# Patient Record
Sex: FEMALE | Race: WHITE | NOT HISPANIC OR LATINO
[De-identification: names, ages, dates, MRNs, and addresses within clinical notes are randomized per-mention and may not be internally consistent; named-entity substitution may affect disease eponyms.]

---

## 2019-04-17 ENCOUNTER — RX RENEWAL (OUTPATIENT)
Age: 49
End: 2019-04-17

## 2019-04-17 PROBLEM — Z00.00 ENCOUNTER FOR PREVENTIVE HEALTH EXAMINATION: Status: ACTIVE | Noted: 2019-04-17

## 2019-04-19 ENCOUNTER — APPOINTMENT (OUTPATIENT)
Dept: ENDOCRINOLOGY | Facility: CLINIC | Age: 49
End: 2019-04-19

## 2019-10-21 ENCOUNTER — RX RENEWAL (OUTPATIENT)
Age: 49
End: 2019-10-21

## 2019-10-30 ENCOUNTER — RX RENEWAL (OUTPATIENT)
Age: 49
End: 2019-10-30

## 2019-11-27 ENCOUNTER — APPOINTMENT (OUTPATIENT)
Dept: ENDOCRINOLOGY | Facility: CLINIC | Age: 49
End: 2019-11-27
Payer: COMMERCIAL

## 2019-11-27 VITALS
HEART RATE: 76 BPM | OXYGEN SATURATION: 97 % | HEIGHT: 63 IN | DIASTOLIC BLOOD PRESSURE: 78 MMHG | SYSTOLIC BLOOD PRESSURE: 118 MMHG | WEIGHT: 154 LBS | TEMPERATURE: 97.8 F | BODY MASS INDEX: 27.29 KG/M2

## 2019-11-27 DIAGNOSIS — Z83.3 FAMILY HISTORY OF DIABETES MELLITUS: ICD-10-CM

## 2019-11-27 DIAGNOSIS — Z78.9 OTHER SPECIFIED HEALTH STATUS: ICD-10-CM

## 2019-11-27 PROCEDURE — 99214 OFFICE O/P EST MOD 30 MIN: CPT | Mod: 25

## 2019-11-27 PROCEDURE — 36415 COLL VENOUS BLD VENIPUNCTURE: CPT

## 2019-12-14 PROBLEM — Z83.3 FAMILY HISTORY OF DIABETES MELLITUS: Status: ACTIVE | Noted: 2019-12-14

## 2019-12-14 PROBLEM — Z78.9 NON-SMOKER: Status: ACTIVE | Noted: 2019-12-14

## 2019-12-14 NOTE — HISTORY OF PRESENT ILLNESS
[FreeTextEntry1] : Ms. KHAI FELIPE   is a 49 year  year old  female who returns today in follow up with regard to a history of hypothyroidism.  Present for over 15 years She  is currently taking [place] thyroid 150 mcg -had been on 180 mcg prior.  Originally was hyperthyroid around 1998 or prior and was tx with with [place] . She  has been compliant in taking the [place] daily, away from food or any medication that may inhibit absorption. She  has tolerated this medication well. Without any apparent adverse effects.  She denies any temperature intolerance, significant weight changes, or severe fatigue. She  in addition denies any palpitations, tremors, anxiousness, change in bowel habits or significant change in moods. .\par Additional medical history includes  D3 4,000 iu daily.\par FH  Father with type 2 dm  no thyroid hx.\par Feels well.  Menses reg. Sleeps well.\par No concerns with hair or nails\par  \par \par

## 2019-12-22 LAB
25(OH)D3 SERPL-MCNC: 28.1 NG/ML
T3FREE SERPL-MCNC: 2.12 PG/ML
T4 FREE SERPL-MCNC: 0.8 NG/DL
THYROGLOB AB SERPL-ACNC: 25 IU/ML
THYROPEROXIDASE AB SERPL IA-ACNC: 26.5 IU/ML
TSH SERPL-ACNC: 0.45 UIU/ML
TSI ACT/NOR SER: 0.59 IU/L
VIT B12 SERPL-MCNC: 737 PG/ML

## 2021-05-27 ENCOUNTER — APPOINTMENT (OUTPATIENT)
Dept: ENDOCRINOLOGY | Facility: CLINIC | Age: 51
End: 2021-05-27
Payer: COMMERCIAL

## 2021-05-27 VITALS
BODY MASS INDEX: 29.06 KG/M2 | WEIGHT: 164 LBS | SYSTOLIC BLOOD PRESSURE: 120 MMHG | HEART RATE: 78 BPM | HEIGHT: 63 IN | TEMPERATURE: 98.6 F | OXYGEN SATURATION: 97 % | RESPIRATION RATE: 16 BRPM | DIASTOLIC BLOOD PRESSURE: 78 MMHG

## 2021-05-27 PROCEDURE — 36415 COLL VENOUS BLD VENIPUNCTURE: CPT

## 2021-05-27 PROCEDURE — 99072 ADDL SUPL MATRL&STAF TM PHE: CPT

## 2021-05-27 PROCEDURE — 99214 OFFICE O/P EST MOD 30 MIN: CPT | Mod: 25

## 2021-05-27 NOTE — HISTORY OF PRESENT ILLNESS
[FreeTextEntry1] : Ms. KHAI FELIPE is a 50 year old female who returns today in follow up with regard to a history of hypothyroidism. Present for over 15 years She is currently taking [place] thyroid 150 mcg 5 days and 120 mcg 2 days.  Originally was hyperthyroid around 1998 or prior and was tx with with [place] . She has been compliant in taking the [place] daily, away from food or any medication that may inhibit absorption. She has tolerated this medication well. Without any apparent adverse effects. She denies any temperature intolerance. The patient does note fatigue and weight gain. She had been doing keto but did not see an results. Notes she drink 4 cups of coffee a day. She in addition denies any palpitations, tremors, anxiousness, change in bowel habits or significant change in moods. Patient states that she is in perimenopause. \par Additional medical history includes D3 5,000 iu daily.\par FH Father with type 2 dm no thyroid hx.\par Feels well. Menses reg. Sleeps well.\par No concerns with hair or nails

## 2021-06-01 LAB
25(OH)D3 SERPL-MCNC: 44.2 NG/ML
ALBUMIN SERPL ELPH-MCNC: 4.6 G/DL
ALP BLD-CCNC: 52 U/L
ALT SERPL-CCNC: 16 U/L
ANION GAP SERPL CALC-SCNC: 14 MMOL/L
AST SERPL-CCNC: 14 U/L
BASOPHILS # BLD AUTO: 0.05 K/UL
BASOPHILS NFR BLD AUTO: 0.9 %
BILIRUB SERPL-MCNC: 0.2 MG/DL
BUN SERPL-MCNC: 11 MG/DL
CALCIUM SERPL-MCNC: 10.1 MG/DL
CHLORIDE SERPL-SCNC: 101 MMOL/L
CO2 SERPL-SCNC: 26 MMOL/L
CREAT SERPL-MCNC: 0.69 MG/DL
EOSINOPHIL # BLD AUTO: 0 K/UL
EOSINOPHIL NFR BLD AUTO: 0 %
ESTIMATED AVERAGE GLUCOSE: 105 MG/DL
GLUCOSE SERPL-MCNC: 96 MG/DL
HBA1C MFR BLD HPLC: 5.3 %
HCT VFR BLD CALC: 38.5 %
HGB BLD-MCNC: 12.9 G/DL
IMM GRANULOCYTES NFR BLD AUTO: 0.2 %
LYMPHOCYTES # BLD AUTO: 1.76 K/UL
LYMPHOCYTES NFR BLD AUTO: 31.4 %
MAN DIFF?: NORMAL
MCHC RBC-ENTMCNC: 29.4 PG
MCHC RBC-ENTMCNC: 33.5 GM/DL
MCV RBC AUTO: 87.7 FL
MONOCYTES # BLD AUTO: 0.47 K/UL
MONOCYTES NFR BLD AUTO: 8.4 %
NEUTROPHILS # BLD AUTO: 3.31 K/UL
NEUTROPHILS NFR BLD AUTO: 59.1 %
PLATELET # BLD AUTO: 231 K/UL
POTASSIUM SERPL-SCNC: 4.2 MMOL/L
PROT SERPL-MCNC: 7.4 G/DL
RBC # BLD: 4.39 M/UL
RBC # FLD: 13 %
SODIUM SERPL-SCNC: 142 MMOL/L
T3FREE SERPL-MCNC: 2.2 PG/ML
T4 FREE SERPL-MCNC: 0.8 NG/DL
TSH SERPL-ACNC: 0.43 UIU/ML
VIT B12 SERPL-MCNC: 545 PG/ML
WBC # FLD AUTO: 5.6 K/UL

## 2021-08-08 ENCOUNTER — TRANSCRIPTION ENCOUNTER (OUTPATIENT)
Age: 51
End: 2021-08-08

## 2021-08-15 ENCOUNTER — TRANSCRIPTION ENCOUNTER (OUTPATIENT)
Age: 51
End: 2021-08-15

## 2021-08-21 ENCOUNTER — TRANSCRIPTION ENCOUNTER (OUTPATIENT)
Age: 51
End: 2021-08-21

## 2021-09-07 ENCOUNTER — TRANSCRIPTION ENCOUNTER (OUTPATIENT)
Age: 51
End: 2021-09-07

## 2021-09-13 ENCOUNTER — TRANSCRIPTION ENCOUNTER (OUTPATIENT)
Age: 51
End: 2021-09-13

## 2021-09-22 ENCOUNTER — TRANSCRIPTION ENCOUNTER (OUTPATIENT)
Age: 51
End: 2021-09-22

## 2021-09-24 ENCOUNTER — TRANSCRIPTION ENCOUNTER (OUTPATIENT)
Age: 51
End: 2021-09-24

## 2021-09-27 ENCOUNTER — TRANSCRIPTION ENCOUNTER (OUTPATIENT)
Age: 51
End: 2021-09-27

## 2021-10-04 ENCOUNTER — TRANSCRIPTION ENCOUNTER (OUTPATIENT)
Age: 51
End: 2021-10-04

## 2021-10-11 ENCOUNTER — TRANSCRIPTION ENCOUNTER (OUTPATIENT)
Age: 51
End: 2021-10-11

## 2021-10-20 ENCOUNTER — TRANSCRIPTION ENCOUNTER (OUTPATIENT)
Age: 51
End: 2021-10-20

## 2021-10-25 ENCOUNTER — TRANSCRIPTION ENCOUNTER (OUTPATIENT)
Age: 51
End: 2021-10-25

## 2021-10-31 ENCOUNTER — TRANSCRIPTION ENCOUNTER (OUTPATIENT)
Age: 51
End: 2021-10-31

## 2021-11-07 ENCOUNTER — TRANSCRIPTION ENCOUNTER (OUTPATIENT)
Age: 51
End: 2021-11-07

## 2021-11-14 ENCOUNTER — TRANSCRIPTION ENCOUNTER (OUTPATIENT)
Age: 51
End: 2021-11-14

## 2021-11-22 ENCOUNTER — TRANSCRIPTION ENCOUNTER (OUTPATIENT)
Age: 51
End: 2021-11-22

## 2021-11-28 ENCOUNTER — TRANSCRIPTION ENCOUNTER (OUTPATIENT)
Age: 51
End: 2021-11-28

## 2021-12-15 ENCOUNTER — APPOINTMENT (OUTPATIENT)
Dept: ENDOCRINOLOGY | Facility: CLINIC | Age: 51
End: 2021-12-15
Payer: COMMERCIAL

## 2021-12-15 VITALS
TEMPERATURE: 98.5 F | BODY MASS INDEX: 29.06 KG/M2 | SYSTOLIC BLOOD PRESSURE: 115 MMHG | HEIGHT: 63 IN | DIASTOLIC BLOOD PRESSURE: 78 MMHG | WEIGHT: 164 LBS | HEART RATE: 75 BPM | OXYGEN SATURATION: 98 %

## 2021-12-15 PROCEDURE — 99214 OFFICE O/P EST MOD 30 MIN: CPT | Mod: 25

## 2021-12-15 PROCEDURE — 36415 COLL VENOUS BLD VENIPUNCTURE: CPT

## 2021-12-15 NOTE — HISTORY OF PRESENT ILLNESS
[FreeTextEntry1] : Ms. KHAI FELIPE is a 51 year old female who returns today in follow up with regard to a history of hypothyroidism. Present for over 15 years She is currently taking [place] thyroid 150 mcg 5 days and 120 mcg 2 days. Originally was hyperthyroid around 1998 or prior and was tx with with [place] . She has been compliant in taking the [place] daily, away from food or any medication that may inhibit absorption. She has tolerated this medication well. Without any apparent adverse effects. She denies any temperature intolerance. The patient does note fatigue and weight gain. She had been doing keto but did not see an results. Notes she drink 4 cups of coffee a day. She in addition denies any palpitations, tremors, anxiousness, change in bowel habits or significant change in moods. Patient states that she is in perimenopause. \par Medication includes D3 5,000 iu daily, vitamin C, magnesium\par FH Father with type 2 dm no thyroid hx.\par Feels well. Menses reg. Sleeps well.\par No concerns with hair or nails \par Has not received the COVID vaccine.

## 2021-12-16 ENCOUNTER — APPOINTMENT (OUTPATIENT)
Dept: ENDOCRINOLOGY | Facility: CLINIC | Age: 51
End: 2021-12-16

## 2021-12-16 LAB
25(OH)D3 SERPL-MCNC: 38.7 NG/ML
ALBUMIN SERPL ELPH-MCNC: 4.3 G/DL
ALP BLD-CCNC: 56 U/L
ALT SERPL-CCNC: 15 U/L
ANION GAP SERPL CALC-SCNC: 9 MMOL/L
AST SERPL-CCNC: 13 U/L
BASOPHILS # BLD AUTO: 0.05 K/UL
BASOPHILS NFR BLD AUTO: 0.9 %
BILIRUB SERPL-MCNC: 0.2 MG/DL
BUN SERPL-MCNC: 9 MG/DL
CALCIUM SERPL-MCNC: 10.1 MG/DL
CHLORIDE SERPL-SCNC: 105 MMOL/L
CHOLEST SERPL-MCNC: 225 MG/DL
CO2 SERPL-SCNC: 28 MMOL/L
CREAT SERPL-MCNC: 0.62 MG/DL
EOSINOPHIL # BLD AUTO: 0 K/UL
EOSINOPHIL NFR BLD AUTO: 0 %
GLUCOSE SERPL-MCNC: 91 MG/DL
HCT VFR BLD CALC: 40.8 %
HDLC SERPL-MCNC: 68 MG/DL
HGB BLD-MCNC: 13.6 G/DL
IMM GRANULOCYTES NFR BLD AUTO: 0 %
LDLC SERPL DIRECT ASSAY-MCNC: 135 MG/DL
LYMPHOCYTES # BLD AUTO: 1.46 K/UL
LYMPHOCYTES NFR BLD AUTO: 27.6 %
MAGNESIUM SERPL-MCNC: 2 MG/DL
MAN DIFF?: NORMAL
MCHC RBC-ENTMCNC: 28.9 PG
MCHC RBC-ENTMCNC: 33.3 GM/DL
MCV RBC AUTO: 86.8 FL
MONOCYTES # BLD AUTO: 0.5 K/UL
MONOCYTES NFR BLD AUTO: 9.5 %
NEUTROPHILS # BLD AUTO: 3.28 K/UL
NEUTROPHILS NFR BLD AUTO: 62 %
PLATELET # BLD AUTO: 171 K/UL
POTASSIUM SERPL-SCNC: 4.1 MMOL/L
PROT SERPL-MCNC: 7 G/DL
RBC # BLD: 4.7 M/UL
RBC # FLD: 14.2 %
SODIUM SERPL-SCNC: 142 MMOL/L
T3FREE SERPL-MCNC: 2.97 PG/ML
T4 FREE SERPL-MCNC: 1 NG/DL
TRIGL SERPL-MCNC: 62 MG/DL
TSH SERPL-ACNC: 0.1 UIU/ML
WBC # FLD AUTO: 5.29 K/UL

## 2021-12-17 LAB — VIT B12 SERPL-MCNC: 481 PG/ML

## 2022-01-25 ENCOUNTER — NON-APPOINTMENT (OUTPATIENT)
Age: 52
End: 2022-01-25

## 2022-08-10 ENCOUNTER — APPOINTMENT (OUTPATIENT)
Dept: ENDOCRINOLOGY | Facility: CLINIC | Age: 52
End: 2022-08-10

## 2022-08-10 VITALS
HEART RATE: 78 BPM | OXYGEN SATURATION: 98 % | DIASTOLIC BLOOD PRESSURE: 78 MMHG | SYSTOLIC BLOOD PRESSURE: 122 MMHG | WEIGHT: 165.56 LBS | BODY MASS INDEX: 29.34 KG/M2 | HEIGHT: 63 IN

## 2022-08-10 DIAGNOSIS — E66.3 OVERWEIGHT: ICD-10-CM

## 2022-08-10 PROCEDURE — 36415 COLL VENOUS BLD VENIPUNCTURE: CPT

## 2022-08-10 PROCEDURE — 99214 OFFICE O/P EST MOD 30 MIN: CPT | Mod: 25

## 2022-08-10 NOTE — HISTORY OF PRESENT ILLNESS
[FreeTextEntry1] : Ms. KHAI FELIPE is a 51 year old female who returns today in follow up with regard to a history of hypothyroidism. Present for over 15 years. Originally was hyperthyroid around 1998 or prior and was tx with with [place] . She has been compliant in taking the [place] daily, away from food or any medication that may inhibit absorption. She has tolerated this medication well. Without any apparent adverse effects. She denies any temperature intolerance.She in addition denies any palpitations, tremors, anxiousness, change in bowel habits or significant change in moods. \par \par Menopause at age 50, LMP over 1 year ago.  \par \par She is currently taking [place] thyroid 150 mcg 5 days and 120 mcg 2 days. \par \par She does note difficulty losing weight despite diet and exercise. She follows low carb diet.  \par \par Medication includes D3 5,000 iu daily, vitamin C, magnesium occassionally \par \par FH Father with type 2 dm no thyroid hx.\par \par No COVID infection. Has not received the COVID vaccine.

## 2022-08-11 LAB
25(OH)D3 SERPL-MCNC: 37.4 NG/ML
ALBUMIN SERPL ELPH-MCNC: 4.6 G/DL
ALP BLD-CCNC: 61 U/L
ALT SERPL-CCNC: 19 U/L
ANION GAP SERPL CALC-SCNC: 15 MMOL/L
AST SERPL-CCNC: 16 U/L
BILIRUB SERPL-MCNC: 0.3 MG/DL
BUN SERPL-MCNC: 15 MG/DL
CALCIUM SERPL-MCNC: 10.4 MG/DL
CHLORIDE SERPL-SCNC: 103 MMOL/L
CHOLEST SERPL-MCNC: 248 MG/DL
CO2 SERPL-SCNC: 24 MMOL/L
CREAT SERPL-MCNC: 0.78 MG/DL
EGFR: 91 ML/MIN/1.73M2
ESTIMATED AVERAGE GLUCOSE: 103 MG/DL
FOLATE SERPL-MCNC: 18.3 NG/ML
GLUCOSE SERPL-MCNC: 92 MG/DL
HBA1C MFR BLD HPLC: 5.2 %
HDLC SERPL-MCNC: 68 MG/DL
LDLC SERPL CALC-MCNC: 149 MG/DL
NONHDLC SERPL-MCNC: 179 MG/DL
POTASSIUM SERPL-SCNC: 4.6 MMOL/L
PROT SERPL-MCNC: 7.7 G/DL
SODIUM SERPL-SCNC: 142 MMOL/L
T3FREE SERPL-MCNC: 2.77 PG/ML
T4 FREE SERPL-MCNC: 1 NG/DL
TRIGL SERPL-MCNC: 153 MG/DL
TSH SERPL-ACNC: 0.04 UIU/ML
VIT B12 SERPL-MCNC: 537 PG/ML

## 2022-08-12 LAB
THYROGLOB AB SERPL-ACNC: <20 IU/ML
THYROPEROXIDASE AB SERPL IA-ACNC: 11.6 IU/ML

## 2022-10-03 LAB
T3FREE SERPL-MCNC: 2.1 PG/ML
T4 FREE SERPL-MCNC: 0.9 NG/DL
TSH SERPL-ACNC: 0.14 UIU/ML

## 2022-11-16 DIAGNOSIS — E07.9 DISORDER OF THYROID, UNSPECIFIED: ICD-10-CM

## 2022-11-16 LAB
T3FREE SERPL-MCNC: 2.48 PG/ML
T4 FREE SERPL-MCNC: 0.9 NG/DL
TSH SERPL-ACNC: 0.07 UIU/ML

## 2023-06-14 ENCOUNTER — APPOINTMENT (OUTPATIENT)
Dept: ENDOCRINOLOGY | Facility: CLINIC | Age: 53
End: 2023-06-14
Payer: COMMERCIAL

## 2023-06-14 VITALS
WEIGHT: 175.25 LBS | OXYGEN SATURATION: 99 % | SYSTOLIC BLOOD PRESSURE: 124 MMHG | BODY MASS INDEX: 31.05 KG/M2 | HEART RATE: 79 BPM | HEIGHT: 63 IN | TEMPERATURE: 98.2 F | DIASTOLIC BLOOD PRESSURE: 78 MMHG

## 2023-06-14 DIAGNOSIS — R63.5 ABNORMAL WEIGHT GAIN: ICD-10-CM

## 2023-06-14 DIAGNOSIS — E55.9 VITAMIN D DEFICIENCY, UNSPECIFIED: ICD-10-CM

## 2023-06-14 DIAGNOSIS — E03.9 HYPOTHYROIDISM, UNSPECIFIED: ICD-10-CM

## 2023-06-14 PROCEDURE — 99213 OFFICE O/P EST LOW 20 MIN: CPT | Mod: 25

## 2023-06-14 PROCEDURE — 36415 COLL VENOUS BLD VENIPUNCTURE: CPT

## 2023-06-15 LAB
25(OH)D3 SERPL-MCNC: 29.5 NG/ML
ALBUMIN SERPL ELPH-MCNC: 4.7 G/DL
ALP BLD-CCNC: 58 U/L
ALT SERPL-CCNC: 27 U/L
ANION GAP SERPL CALC-SCNC: 13 MMOL/L
AST SERPL-CCNC: 15 U/L
BILIRUB SERPL-MCNC: 0.3 MG/DL
BUN SERPL-MCNC: 11 MG/DL
CALCIUM SERPL-MCNC: 10.1 MG/DL
CHLORIDE SERPL-SCNC: 102 MMOL/L
CO2 SERPL-SCNC: 26 MMOL/L
CREAT SERPL-MCNC: 0.68 MG/DL
EGFR: 104 ML/MIN/1.73M2
ESTIMATED AVERAGE GLUCOSE: 111 MG/DL
FOLATE SERPL-MCNC: 10.8 NG/ML
GLUCOSE SERPL-MCNC: 96 MG/DL
HBA1C MFR BLD HPLC: 5.5 %
POTASSIUM SERPL-SCNC: 4 MMOL/L
PROT SERPL-MCNC: 7.2 G/DL
SODIUM SERPL-SCNC: 141 MMOL/L
T3FREE SERPL-MCNC: 2.28 PG/ML
T4 FREE SERPL-MCNC: 0.8 NG/DL
TSH SERPL-ACNC: 0.54 UIU/ML
VIT B12 SERPL-MCNC: 591 PG/ML

## 2023-06-16 LAB
THYROGLOB AB SERPL-ACNC: <20 IU/ML
THYROPEROXIDASE AB SERPL IA-ACNC: 20.3 IU/ML

## 2023-06-25 NOTE — HISTORY OF PRESENT ILLNESS
[FreeTextEntry1] : Ms. KHAI FELIPE is a 51 year old female who returns today in follow up with regard to a history of hypothyroidism. Present for over 15 years. Originally was hyperthyroid around 1998 or prior and was tx with with [place] . She has been compliant in taking the [place] daily, away from food or any medication that may inhibit absorption. She has tolerated this medication well. Without any apparent adverse effects. \par \par Menopause at age 50\par \par She is currently taking [place] thyroid 120 mcg Monday through friday and 150 mcg on saturday and sunday  \par \par She does note difficulty losing weight despite diet and exercise. She follows low carb diet although at times has been indulging more. She tries to walk. \par \par current weight 175 lb ; previous 165 in august 2022 \par \par  She denies any palpitations, sob , tremors, anxiety, temperature intolerance, severe fatigue, change in bowel habits, changes in mood. she also denies skin, hair, or nail changes.\par \par She stated she had thyroid US in past but with no identified nodules. Patient with a history of prior thyroid nodularity noted on thyroid ultrasound. There has been no specific discomfort or any new swelling in the anterior neck region. Exam today is stable. Will thus look to carry out updated thyroid US and compare to prior.\par \par \par Medication includes D3 5,000 iu daily, vitamin C, magnesium occasionally \par \par FH Father with type 2 dm no thyroid hx.\par \par

## 2023-07-17 ENCOUNTER — APPOINTMENT (OUTPATIENT)
Dept: ULTRASOUND IMAGING | Facility: CLINIC | Age: 53
End: 2023-07-17
Payer: COMMERCIAL

## 2023-07-17 PROCEDURE — 76536 US EXAM OF HEAD AND NECK: CPT

## 2024-01-25 RX ORDER — THYROID, PORCINE 30 MG/1
30 TABLET ORAL
Qty: 24 | Refills: 1 | Status: ACTIVE | COMMUNITY
Start: 2020-02-21 | End: 1900-01-01

## 2024-01-25 RX ORDER — THYROID, PORCINE 120 MG/1
120 TABLET ORAL
Qty: 90 | Refills: 1 | Status: ACTIVE | COMMUNITY
Start: 2019-04-17 | End: 1900-01-01

## 2024-07-23 ENCOUNTER — RX RENEWAL (OUTPATIENT)
Age: 54
End: 2024-07-23

## 2024-09-04 ENCOUNTER — APPOINTMENT (OUTPATIENT)
Dept: ENDOCRINOLOGY | Facility: CLINIC | Age: 54
End: 2024-09-04
Payer: COMMERCIAL

## 2024-09-04 VITALS
WEIGHT: 153.13 LBS | OXYGEN SATURATION: 99 % | SYSTOLIC BLOOD PRESSURE: 115 MMHG | DIASTOLIC BLOOD PRESSURE: 75 MMHG | HEART RATE: 90 BPM | BODY MASS INDEX: 27.13 KG/M2 | HEIGHT: 63 IN

## 2024-09-04 DIAGNOSIS — Z00.00 ENCOUNTER FOR GENERAL ADULT MEDICAL EXAMINATION W/OUT ABNORMAL FINDINGS: ICD-10-CM

## 2024-09-04 DIAGNOSIS — R63.5 ABNORMAL WEIGHT GAIN: ICD-10-CM

## 2024-09-04 DIAGNOSIS — E55.9 VITAMIN D DEFICIENCY, UNSPECIFIED: ICD-10-CM

## 2024-09-04 PROCEDURE — 36415 COLL VENOUS BLD VENIPUNCTURE: CPT

## 2024-09-04 PROCEDURE — 99214 OFFICE O/P EST MOD 30 MIN: CPT

## 2024-09-04 NOTE — HISTORY OF PRESENT ILLNESS
[FreeTextEntry1] : Ms. KHAI FELIPE is a 53 year old female who returns today in follow up with regard to a history of hypothyroidism. Present for over 15 years. Originally was hyperthyroid around 1998 or prior and was tx with with [place] .   She has been compliant in taking the [place] daily, away from food or any medication that may inhibit absorption. She has tolerated this medication well. Without any apparent adverse effects.   Menopause at age 50  She is currently taking [place] thyroid 120 mcg Monday through friday and 150 mcg on saturday and sunday  She does note difficulty losing weight despite diet and exercise. She follows low carb diet although at times has been indulging more. She tries to walk.  current weight 143 lbs  she has been intentionally trying to lose weight on keto diet  she has been trying to walk more   She denies any palpitations, sob , tremors, anxiety, temperature intolerance, severe fatigue, change in bowel habits, changes in mood. she also denies skin, hair, or nail changes.  thyroid US 7/17/23 IMPRESSION: Shrunken lobes of the thyroid gland consistent with patient's history of Synthroid therapy. No cystic or solid masses seen.  Medication includes D3 5,000 iu 1-2x per week, vitamin C, magnesium occasionally  FH Father with type 2 dm no thyroid hx.

## 2024-09-05 DIAGNOSIS — E03.9 HYPOTHYROIDISM, UNSPECIFIED: ICD-10-CM

## 2024-09-05 DIAGNOSIS — E07.9 DISORDER OF THYROID, UNSPECIFIED: ICD-10-CM

## 2024-09-05 LAB
25(OH)D3 SERPL-MCNC: 28.1 NG/ML
ALBUMIN SERPL ELPH-MCNC: 4.6 G/DL
ALP BLD-CCNC: 74 U/L
ALT SERPL-CCNC: 21 U/L
ANION GAP SERPL CALC-SCNC: 14 MMOL/L
AST SERPL-CCNC: 17 U/L
BILIRUB SERPL-MCNC: 0.3 MG/DL
BUN SERPL-MCNC: 17 MG/DL
CALCIUM SERPL-MCNC: 9.6 MG/DL
CHLORIDE SERPL-SCNC: 103 MMOL/L
CHOLEST SERPL-MCNC: 281 MG/DL
CO2 SERPL-SCNC: 25 MMOL/L
CREAT SERPL-MCNC: 0.83 MG/DL
EGFR: 84 ML/MIN/1.73M2
ESTIMATED AVERAGE GLUCOSE: 105 MG/DL
GLUCOSE SERPL-MCNC: 55 MG/DL
HBA1C MFR BLD HPLC: 5.3 %
HDLC SERPL-MCNC: 84 MG/DL
LDLC SERPL CALC-MCNC: 176 MG/DL
NONHDLC SERPL-MCNC: 197 MG/DL
POTASSIUM SERPL-SCNC: 4.8 MMOL/L
PROT SERPL-MCNC: 7 G/DL
SODIUM SERPL-SCNC: 142 MMOL/L
T3FREE SERPL-MCNC: 2.71 PG/ML
T4 FREE SERPL-MCNC: 1 NG/DL
TRIGL SERPL-MCNC: 123 MG/DL
TSH SERPL-ACNC: 0.04 UIU/ML

## 2024-12-02 DIAGNOSIS — E03.9 HYPOTHYROIDISM, UNSPECIFIED: ICD-10-CM

## 2025-02-18 DIAGNOSIS — E03.9 HYPOTHYROIDISM, UNSPECIFIED: ICD-10-CM

## 2025-02-19 RX ORDER — THYROID, PORCINE 60 MG/1
60 TABLET ORAL
Qty: 144 | Refills: 1 | Status: ACTIVE | COMMUNITY
Start: 2025-02-18 | End: 1900-01-01

## 2025-03-05 ENCOUNTER — APPOINTMENT (OUTPATIENT)
Dept: ENDOCRINOLOGY | Facility: CLINIC | Age: 55
End: 2025-03-05